# Patient Record
Sex: MALE | URBAN - METROPOLITAN AREA
[De-identification: names, ages, dates, MRNs, and addresses within clinical notes are randomized per-mention and may not be internally consistent; named-entity substitution may affect disease eponyms.]

---

## 2017-09-28 ENCOUNTER — NEW PATIENT (OUTPATIENT)
Dept: URBAN - METROPOLITAN AREA CLINIC 30 | Facility: CLINIC | Age: 52
End: 2017-09-28
Payer: COMMERCIAL

## 2017-09-28 DIAGNOSIS — H40.013 OPEN ANGLE WITH BORDERLINE FINDINGS, LOW RISK, BILATERAL: Primary | ICD-10-CM

## 2017-09-28 PROCEDURE — 92015 DETERMINE REFRACTIVE STATE: CPT | Performed by: OPTOMETRIST

## 2017-09-28 PROCEDURE — 92004 COMPRE OPH EXAM NEW PT 1/>: CPT | Performed by: OPTOMETRIST

## 2017-09-28 ASSESSMENT — INTRAOCULAR PRESSURE
OS: 14
OD: 12

## 2017-09-28 ASSESSMENT — VISUAL ACUITY
OS: 20/20
OD: 20/20

## 2021-09-07 ENCOUNTER — OFFICE VISIT (OUTPATIENT)
Dept: URBAN - METROPOLITAN AREA CLINIC 30 | Facility: CLINIC | Age: 56
End: 2021-09-07
Payer: COMMERCIAL

## 2021-09-07 DIAGNOSIS — H35.40 UNSPECIFIED PERIPHERAL RETINAL DEGENERATION: ICD-10-CM

## 2021-09-07 DIAGNOSIS — H53.2 DIPLOPIA: ICD-10-CM

## 2021-09-07 PROCEDURE — 99203 OFFICE O/P NEW LOW 30 MIN: CPT | Performed by: OPTOMETRIST

## 2021-09-07 PROCEDURE — 76514 ECHO EXAM OF EYE THICKNESS: CPT | Performed by: OPTOMETRIST

## 2021-09-07 PROCEDURE — 92133 CPTRZD OPH DX IMG PST SGM ON: CPT | Performed by: OPTOMETRIST

## 2021-09-07 ASSESSMENT — KERATOMETRY
OD: 41.59
OS: 41.90

## 2021-09-07 ASSESSMENT — INTRAOCULAR PRESSURE
OD: 15
OS: 15

## 2021-09-07 ASSESSMENT — VISUAL ACUITY
OD: 20/20
OS: 20/20

## 2021-09-07 NOTE — IMPRESSION/PLAN
Impression: Open angle with borderline findings, low risk, bilateral

Pachs; 559, 552 Plan: Due to cupping. IOPs remain stable OU. -Fhx. Monitor without tx. RTC 6 month for possible baseline VF 24-2, gonio, IOP check 9/2021; RNFL WNL OU

## 2021-09-07 NOTE — IMPRESSION/PLAN
Impression: Peripheral retinal degeneration Plan: Pt educ on findings. Retinas flat and attached OU. Reviewed signs and symptoms of RD and to call asap if occurs.

## 2021-09-07 NOTE — IMPRESSION/PLAN
Impression: Diplopia: H53.2. Plan: Pt notes intermittent horizontal diplopia at far. Pt notes after computer work. CT today shows ortho at far. No tropia. Monitor. May have DI.

## 2022-05-26 ENCOUNTER — OFFICE VISIT (OUTPATIENT)
Dept: URBAN - METROPOLITAN AREA CLINIC 30 | Facility: CLINIC | Age: 57
End: 2022-05-26
Payer: COMMERCIAL

## 2022-05-26 DIAGNOSIS — H35.40 UNSPECIFIED PERIPHERAL RETINAL DEGENERATION: ICD-10-CM

## 2022-05-26 DIAGNOSIS — H53.2 DIPLOPIA: Primary | ICD-10-CM

## 2022-05-26 DIAGNOSIS — H40.013 OPEN ANGLE WITH BORDERLINE FINDINGS, LOW RISK, BILATERAL: ICD-10-CM

## 2022-05-26 PROCEDURE — 92134 CPTRZ OPH DX IMG PST SGM RTA: CPT | Performed by: OPTOMETRIST

## 2022-05-26 PROCEDURE — 92133 CPTRZD OPH DX IMG PST SGM ON: CPT | Performed by: OPTOMETRIST

## 2022-05-26 PROCEDURE — 99213 OFFICE O/P EST LOW 20 MIN: CPT | Performed by: OPTOMETRIST

## 2022-05-26 ASSESSMENT — KERATOMETRY
OD: 41.77
OS: 41.73

## 2022-05-26 ASSESSMENT — INTRAOCULAR PRESSURE
OD: 14
OS: 14

## 2022-05-26 ASSESSMENT — VISUAL ACUITY
OS: 20/20
OD: 20/20

## 2022-05-26 NOTE — IMPRESSION/PLAN
Impression: Peripheral retinal degeneration Plan: Pt educ on findings. Retinas flat and attached OU. Reviewed signs and symptoms of RD and to call asap if occurs. SEE MAC OCT.

## 2022-05-26 NOTE — IMPRESSION/PLAN
Impression: Diplopia: H53.2. Plan: Pt notes intermittent horizontal diplopia at far. Pt notes after 5-6 hours of computer work- recommending taking breaks. Pt notes diplopia when one eye is closed. Previous CT shows ortho at far. No tropia. May have DI. Consider prism is symptoms worsen. Monitor.

## 2022-05-26 NOTE — IMPRESSION/PLAN
Impression: Open angle with borderline findings, low risk, bilateral
-FHX Pachs; 559, 552 Plan: Due to cupping. IOPs remain stable today OU.  Monitor without tx. 

05/2022 RNFL OU) WNL